# Patient Record
Sex: FEMALE | Race: WHITE | Employment: OTHER | URBAN - METROPOLITAN AREA
[De-identification: names, ages, dates, MRNs, and addresses within clinical notes are randomized per-mention and may not be internally consistent; named-entity substitution may affect disease eponyms.]

---

## 2020-01-08 ENCOUNTER — HOSPITAL ENCOUNTER (OUTPATIENT)
Dept: CT IMAGING | Age: 75
Discharge: HOME | End: 2020-01-08
Attending: FAMILY MEDICINE
Payer: SELF-PAY

## 2020-01-08 DIAGNOSIS — E78.2 HYPERLIPIDEMIA, MIXED: ICD-10-CM

## 2020-01-08 PROCEDURE — 75571 CT HRT W/O DYE W/CA TEST: CPT

## 2020-01-10 NOTE — CARDIO/PULMONARY
Reached patient at her given mobile number and shared her coronary artery calcium score of 122 with her. We discussed the meaning of this score. Patient plans to follow up with her PCP, Dr. Em Nava, who was the ordering provider. Patient has no further questions at this time.

## 2023-01-17 ENCOUNTER — TELEPHONE (OUTPATIENT)
Dept: CARDIOLOGY CLINIC | Age: 78
End: 2023-01-17

## 2023-01-24 ENCOUNTER — OFFICE VISIT (OUTPATIENT)
Dept: CARDIOLOGY CLINIC | Age: 78
End: 2023-01-24
Payer: MEDICARE

## 2023-01-24 VITALS
HEIGHT: 61 IN | HEART RATE: 70 BPM | OXYGEN SATURATION: 96 % | SYSTOLIC BLOOD PRESSURE: 120 MMHG | BODY MASS INDEX: 18.81 KG/M2 | WEIGHT: 99.6 LBS | DIASTOLIC BLOOD PRESSURE: 78 MMHG

## 2023-01-24 DIAGNOSIS — I25.119 CORONARY ARTERY DISEASE WITH ANGINA PECTORIS, UNSPECIFIED VESSEL OR LESION TYPE, UNSPECIFIED WHETHER NATIVE OR TRANSPLANTED HEART (HCC): Primary | ICD-10-CM

## 2023-01-24 DIAGNOSIS — M43.16 SPONDYLOLISTHESIS OF LUMBAR REGION: ICD-10-CM

## 2023-01-24 DIAGNOSIS — R06.02 SHORTNESS OF BREATH: ICD-10-CM

## 2023-01-24 DIAGNOSIS — E78.49 OTHER HYPERLIPIDEMIA: ICD-10-CM

## 2023-01-24 PROCEDURE — G8536 NO DOC ELDER MAL SCRN: HCPCS | Performed by: INTERNAL MEDICINE

## 2023-01-24 PROCEDURE — 1090F PRES/ABSN URINE INCON ASSESS: CPT | Performed by: INTERNAL MEDICINE

## 2023-01-24 PROCEDURE — G8400 PT W/DXA NO RESULTS DOC: HCPCS | Performed by: INTERNAL MEDICINE

## 2023-01-24 PROCEDURE — G8427 DOCREV CUR MEDS BY ELIG CLIN: HCPCS | Performed by: INTERNAL MEDICINE

## 2023-01-24 PROCEDURE — G8432 DEP SCR NOT DOC, RNG: HCPCS | Performed by: INTERNAL MEDICINE

## 2023-01-24 PROCEDURE — 1101F PT FALLS ASSESS-DOCD LE1/YR: CPT | Performed by: INTERNAL MEDICINE

## 2023-01-24 PROCEDURE — 93010 ELECTROCARDIOGRAM REPORT: CPT | Performed by: INTERNAL MEDICINE

## 2023-01-24 PROCEDURE — 99204 OFFICE O/P NEW MOD 45 MIN: CPT | Performed by: INTERNAL MEDICINE

## 2023-01-24 PROCEDURE — G8420 CALC BMI NORM PARAMETERS: HCPCS | Performed by: INTERNAL MEDICINE

## 2023-01-24 PROCEDURE — 1123F ACP DISCUSS/DSCN MKR DOCD: CPT | Performed by: INTERNAL MEDICINE

## 2023-01-24 PROCEDURE — 93005 ELECTROCARDIOGRAM TRACING: CPT | Performed by: INTERNAL MEDICINE

## 2023-01-24 PROCEDURE — G0463 HOSPITAL OUTPT CLINIC VISIT: HCPCS | Performed by: INTERNAL MEDICINE

## 2023-01-24 RX ORDER — DICLOFENAC SODIUM 75 MG/1
75 TABLET, DELAYED RELEASE ORAL 2 TIMES DAILY
COMMUNITY
Start: 2023-01-12

## 2023-01-24 RX ORDER — AMLODIPINE BESYLATE 5 MG/1
5 TABLET ORAL DAILY
COMMUNITY
Start: 2023-01-12

## 2023-01-24 RX ORDER — EZETIMIBE 10 MG/1
10 TABLET ORAL DAILY
Qty: 90 TABLET | Refills: 3 | Status: SHIPPED | OUTPATIENT
Start: 2023-01-24

## 2023-01-24 NOTE — PROGRESS NOTES
Chief Complaint   Patient presents with    Follow-up     Est. Care   Referred by PCP    Coronary Artery Disease       Vitals:    01/24/23 1031   BP: 120/78   Pulse: 70   Height: 5' 1\" (1.549 m)   Weight: 99 lb 9.6 oz (45.2 kg)   SpO2: 96%         Chest pain: tightness    SOB: no    Palpitations: racing anxiety     Dizziness: upon standing up fast    Swelling: no    Refills:       Have you been to the ER, urgent care clinic since your last visit? Hospitalized since your last visit? no    Have you seen or consulted other health care providers outside of the [facility] since your last visit?  (Include any pap smears or colon screening.)

## 2023-01-24 NOTE — PROGRESS NOTES
Per Dr. Leslie Samayoa- follow up 6 months with echo same day. Lipids prior to 6 mos appointment. Patient has lab req and instruction. Neurosurgery referral faxed to Dr. Janet Astudillo at [place]. Patient has contact info. [facility] neurosurgery contact info [phone] given as back up.

## 2023-01-24 NOTE — PROGRESS NOTES
De Fleming MD, MS, [address]            HISTORY OF PRESENT ILLNESS:    Danita Aguirre is a 68 y.o. female here to establish care. 2020 - moderate coronary plaque 60% percentile rank. Has been intolerant atorva, prava, simva. Destroyed her muscles,gave her brain fog. . Dr. Xavier Shabazz - orthopedic - wants to see Neurosurgery. Mom lived to 80. Dad  64, cancer, bladder. Sister heart rhythm. Is on amlodipine for raynauds. Gets bad cramps in night from spinal stenosis.    + SOB, has anxiety. Discussed trial of zetia. Discussed stress testing - defers for now given she is feeling relatively well. Asks for suggestions on Neurosurgeon for spinal stenosis. SUMMARY:   Problem List  Date Reviewed: 2023            Codes Class Noted    Other hyperlipidemia ICD-10-CM: E78.49  ICD-9-CM: 272.4  2023        SOB (shortness of breath) ICD-10-CM: R06.02  ICD-9-CM: 786.05  2023        Spondylolisthesis of lumbar region ICD-10-CM: M43.16  ICD-9-CM: 738.4  2014           Current Outpatient Medications on File Prior to Visit   Medication Sig    diclofenac EC (VOLTAREN) 75 mg EC tablet Take 75 mg by mouth two (2) times a day. amLODIPine (NORVASC) 5 mg tablet Take 5 mg by mouth daily. For Raynauds. No current facility-administered medications on file prior to visit. CARDIOLOGY STUDIES TO DATE:  No results found for this visit on 23. CARDIAC ROS:   positive for dyspnea    Past Medical History:   Diagnosis Date    Arthritis        History reviewed. No pertinent family history.     Social History     Socioeconomic History    Marital status:      Spouse name: Not on file    Number of children: Not on file    Years of education: Not on file    Highest education level: Not on file   Occupational History    Not on file   Tobacco Use    Smoking status: Former    Smokeless tobacco: Never   Substance and Sexual Activity    Alcohol use: Yes     Alcohol/week: 0.0 standard drinks     Types: 7 - 10 Glasses of wine per week    Drug use: No    Sexual activity: Not on file   Other Topics Concern    Not on file   Social History Narrative    Not on file     Social Determinants of Health     Financial Resource Strain: Not on file   Food Insecurity: Not on file   Transportation Needs: Not on file   Physical Activity: Not on file   Stress: Not on file   Social Connections: Not on file   Intimate Partner Violence: Not on file   Housing Stability: Not on file        GENERAL ROS:  A comprehensive review of systems was negative except for that written in the HPI.     Visit Vitals  /78   Pulse 70   Ht 5' 1\" (1.549 m)   Wt 45.2 kg (99 lb 9.6 oz)   SpO2 96%   BMI 18.82 kg/m²     Vitals:    01/24/23 1031   BP: 120/78   Pulse: 70   SpO2: 96%   Weight: 45.2 kg (99 lb 9.6 oz)   Height: 5' 1\" (1.549 m)        Wt Readings from Last 3 Encounters:   01/24/23 45.2 kg (99 lb 9.6 oz)   02/25/14 51.3 kg (113 lb 1.6 oz)   02/13/14 47.6 kg (105 lb)            BP Readings from Last 3 Encounters:   01/24/23 120/78   02/26/14 112/61       PHYSICAL EXAM  General appearance: alert, cooperative, no distress, appears stated age  Neck: supple, symmetrical, trachea midline, no adenopathy, thyroid: not enlarged, symmetric, no tenderness/mass/nodules, no carotid bruit, and no JVD  Lungs: clear to auscultation bilaterally  Heart: regular rate and rhythm, S1, S2 normal, no murmur, click, rub or gallop  Extremities: extremities normal, atraumatic, no cyanosis or edema    No results found for: CHOL, CHOLX, CHLST, CHOLV, [id number], HDL, HDLP, LDL, LDLC, DLDLP, TGLX, TRIGL, TRIGP, CHHD, CHHDX    Lab Results   Component Value Date/Time    WBC 7.4 02/26/2014 05:42 AM    HGB 9.2 (L) 02/26/2014 05:42 AM    HCT 27.4 (L) 02/26/2014 05:42 AM    PLATELET 269 64/73/6765 05:42 AM    MCV 89.5 02/26/2014 05:42 AM        No results found for: CHOL, CHOLPOCT, CHOLX, CHLST, CHOLV, HDL, HDLP, LDL, LDLC, DLDLP, TGLX, TRIGL, TRIGP, TGLPOCT, NTGLT, CHHD, [address]  [facility]     ASSESSMENT  Diagnoses and all orders for this visit:    1. Coronary artery disease with angina pectoris, unspecified vessel or lesion type, unspecified whether native or transplanted heart (HCC)  -     AMB POC EKG ROUTINE W/ 12 LEADS, INTER & REP  -     ezetimibe (ZETIA) 10 mg tablet; Take 1 Tablet by mouth daily.  -     LIPID PANEL; Future  -     ECHO ADULT COMPLETE; Future    2. Spondylolisthesis of lumbar region  -     REFERRAL TO NEUROSURGERY    3. Shortness of breath  -     ECHO ADULT COMPLETE; Future    4. Other hyperlipidemia       Encounter Diagnoses   Name Primary? Coronary artery disease with angina pectoris, unspecified vessel or lesion type, unspecified whether native or transplanted heart ([facility] [address] Yes    Spondylolisthesis of lumbar region     Shortness of breath     Other hyperlipidemia      Orders Placed This Encounter    LIPID PANEL    REFERRAL TO NEUROSURGERY    AMB POC EKG ROUTINE W/ 12 LEADS, INTER & REP    diclofenac EC (VOLTAREN) 75 mg EC tablet    amLODIPine (NORVASC) 5 mg tablet    ezetimibe (ZETIA) 10 mg tablet             Dagoberto Charles MD  1/24/2023        [address]   [address] [facility] [address]  [place], [address]  [phone] (F)    [address] [id number] [address]  [phone] (P)  [phone] (F)    ATTENTION:   This medical record was transcribed using an electronic medical records/speech recognition system. Although proofread, it may and can contain electronic, spelling and other errors. Corrections may be executed at a later time. Please feel free to contact us for any clarifications as needed.

## 2023-07-17 LAB
CHOLEST SERPL-MCNC: 239 MG/DL
HDLC SERPL-MCNC: 97 MG/DL
HDLC SERPL: 2.5 (ref 0–5)
LDLC SERPL CALC-MCNC: 128.8 MG/DL (ref 0–100)
TRIGL SERPL-MCNC: 66 MG/DL
VLDLC SERPL CALC-MCNC: 13.2 MG/DL

## 2023-07-24 ENCOUNTER — OFFICE VISIT (OUTPATIENT)
Age: 78
End: 2023-07-24
Payer: MEDICARE

## 2023-07-24 ENCOUNTER — ANCILLARY PROCEDURE (OUTPATIENT)
Age: 78
End: 2023-07-24
Payer: MEDICARE

## 2023-07-24 VITALS
BODY MASS INDEX: 18.69 KG/M2 | WEIGHT: 99 LBS | SYSTOLIC BLOOD PRESSURE: 120 MMHG | DIASTOLIC BLOOD PRESSURE: 78 MMHG | HEIGHT: 61 IN

## 2023-07-24 VITALS
SYSTOLIC BLOOD PRESSURE: 128 MMHG | HEIGHT: 59 IN | WEIGHT: 99 LBS | BODY MASS INDEX: 19.96 KG/M2 | DIASTOLIC BLOOD PRESSURE: 86 MMHG | HEART RATE: 58 BPM | OXYGEN SATURATION: 98 %

## 2023-07-24 DIAGNOSIS — R06.02 SOB (SHORTNESS OF BREATH): ICD-10-CM

## 2023-07-24 DIAGNOSIS — I25.10 CAD (CORONARY ARTERY DISEASE): ICD-10-CM

## 2023-07-24 DIAGNOSIS — E78.49 OTHER HYPERLIPIDEMIA: Primary | ICD-10-CM

## 2023-07-24 LAB
ECHO AO ASC DIAM: 3.5 CM
ECHO AO ASCENDING AORTA INDEX: 2.55 CM/M2
ECHO AO ROOT DIAM: 3.2 CM
ECHO AO ROOT INDEX: 2.34 CM/M2
ECHO AV AREA PEAK VELOCITY: 2.7 CM2
ECHO AV AREA VTI: 2.6 CM2
ECHO AV AREA/BSA PEAK VELOCITY: 2 CM2/M2
ECHO AV AREA/BSA VTI: 1.9 CM2/M2
ECHO AV MEAN GRADIENT: 3 MMHG
ECHO AV MEAN VELOCITY: 0.7 M/S
ECHO AV PEAK GRADIENT: 6 MMHG
ECHO AV PEAK VELOCITY: 1.2 M/S
ECHO AV VELOCITY RATIO: 0.92
ECHO AV VTI: 25.3 CM
ECHO BSA: 1.39 M2
ECHO LA DIAMETER INDEX: 2.41 CM/M2
ECHO LA DIAMETER: 3.3 CM
ECHO LA TO AORTIC ROOT RATIO: 1.03
ECHO LA VOL 2C: 49 ML (ref 22–52)
ECHO LA VOL 4C: 36 ML (ref 22–52)
ECHO LA VOL BP: 46 ML (ref 22–52)
ECHO LA VOL/BSA BIPLANE: 34 ML/M2 (ref 16–34)
ECHO LA VOLUME AREA LENGTH: 50 ML
ECHO LA VOLUME INDEX A2C: 36 ML/M2 (ref 16–34)
ECHO LA VOLUME INDEX A4C: 26 ML/M2 (ref 16–34)
ECHO LA VOLUME INDEX AREA LENGTH: 36 ML/M2 (ref 16–34)
ECHO LV E' LATERAL VELOCITY: 10 CM/S
ECHO LV E' SEPTAL VELOCITY: 6 CM/S
ECHO LV EDV A2C: 78 ML
ECHO LV EDV A4C: 73 ML
ECHO LV EDV BP: 76 ML (ref 56–104)
ECHO LV EDV INDEX A4C: 53 ML/M2
ECHO LV EDV INDEX BP: 55 ML/M2
ECHO LV EDV NDEX A2C: 57 ML/M2
ECHO LV EJECTION FRACTION A2C: 53 %
ECHO LV EJECTION FRACTION A4C: 59 %
ECHO LV EJECTION FRACTION BIPLANE: 55 % (ref 55–100)
ECHO LV ESV A2C: 37 ML
ECHO LV ESV A4C: 30 ML
ECHO LV ESV BP: 35 ML (ref 19–49)
ECHO LV ESV INDEX A2C: 27 ML/M2
ECHO LV ESV INDEX A4C: 22 ML/M2
ECHO LV ESV INDEX BP: 26 ML/M2
ECHO LV FRACTIONAL SHORTENING: 45 % (ref 28–44)
ECHO LV INTERNAL DIMENSION DIASTOLE INDEX: 2.41 CM/M2
ECHO LV INTERNAL DIMENSION DIASTOLIC: 3.3 CM (ref 3.9–5.3)
ECHO LV INTERNAL DIMENSION SYSTOLIC INDEX: 1.31 CM/M2
ECHO LV INTERNAL DIMENSION SYSTOLIC: 1.8 CM
ECHO LV IVSD: 1.3 CM (ref 0.6–0.9)
ECHO LV MASS 2D: 116.8 G (ref 67–162)
ECHO LV MASS INDEX 2D: 85.3 G/M2 (ref 43–95)
ECHO LV POSTERIOR WALL DIASTOLIC: 1 CM (ref 0.6–0.9)
ECHO LV RELATIVE WALL THICKNESS RATIO: 0.61
ECHO LVOT AREA: 3.1 CM2
ECHO LVOT AV VTI INDEX: 0.88
ECHO LVOT DIAM: 2 CM
ECHO LVOT MEAN GRADIENT: 2 MMHG
ECHO LVOT PEAK GRADIENT: 5 MMHG
ECHO LVOT PEAK VELOCITY: 1.1 M/S
ECHO LVOT STROKE VOLUME INDEX: 51.1 ML/M2
ECHO LVOT SV: 70 ML
ECHO LVOT VTI: 22.3 CM
ECHO MV A VELOCITY: 1.14 M/S
ECHO MV AREA PHT: 3.1 CM2
ECHO MV E DECELERATION TIME (DT): 241.3 MS
ECHO MV E VELOCITY: 0.66 M/S
ECHO MV E/A RATIO: 0.58
ECHO MV E/E' LATERAL: 6.6
ECHO MV E/E' RATIO (AVERAGED): 8.8
ECHO MV E/E' SEPTAL: 11
ECHO MV PRESSURE HALF TIME (PHT): 70 MS
ECHO MV REGURGITANT ALIASING (NYQUIST) VELOCITY: 37 CM/S
ECHO RV FREE WALL PEAK S': 12 CM/S
ECHO RV TAPSE: 2.2 CM (ref 1.7–?)
ECHO TV REGURGITANT MAX VELOCITY: 2.27 M/S
ECHO TV REGURGITANT PEAK GRADIENT: 21 MMHG

## 2023-07-24 PROCEDURE — 93306 TTE W/DOPPLER COMPLETE: CPT

## 2023-07-24 PROCEDURE — 1090F PRES/ABSN URINE INCON ASSESS: CPT | Performed by: INTERNAL MEDICINE

## 2023-07-24 PROCEDURE — 99214 OFFICE O/P EST MOD 30 MIN: CPT | Performed by: INTERNAL MEDICINE

## 2023-07-24 PROCEDURE — G8420 CALC BMI NORM PARAMETERS: HCPCS | Performed by: INTERNAL MEDICINE

## 2023-07-24 PROCEDURE — G8400 PT W/DXA NO RESULTS DOC: HCPCS | Performed by: INTERNAL MEDICINE

## 2023-07-24 PROCEDURE — 1036F TOBACCO NON-USER: CPT | Performed by: INTERNAL MEDICINE

## 2023-07-24 PROCEDURE — 1123F ACP DISCUSS/DSCN MKR DOCD: CPT | Performed by: INTERNAL MEDICINE

## 2023-07-24 PROCEDURE — G8427 DOCREV CUR MEDS BY ELIG CLIN: HCPCS | Performed by: INTERNAL MEDICINE

## 2023-07-24 RX ORDER — TERBINAFINE HYDROCHLORIDE 250 MG/1
250 TABLET ORAL DAILY
COMMUNITY
Start: 2023-06-05

## 2023-07-24 RX ORDER — AMLODIPINE BESYLATE 10 MG/1
10 TABLET ORAL DAILY
COMMUNITY
Start: 2023-06-27

## 2023-07-24 RX ORDER — ACETAMINOPHEN 325 MG/1
650 TABLET ORAL EVERY 6 HOURS PRN
COMMUNITY

## 2023-07-24 RX ORDER — HYDROXYCHLOROQUINE SULFATE 200 MG/1
200 TABLET, FILM COATED ORAL DAILY
COMMUNITY
Start: 2023-06-30

## 2023-07-24 RX ORDER — CYCLOBENZAPRINE HCL 5 MG
TABLET ORAL
COMMUNITY
Start: 2023-07-12

## 2023-07-24 ASSESSMENT — ENCOUNTER SYMPTOMS
SHORTNESS OF BREATH: 0
WHEEZING: 0
BACK PAIN: 1
CHEST TIGHTNESS: 0

## 2023-07-24 NOTE — PROGRESS NOTES
[place], [address] [facility], [place]            HISTORY OF PRESENT ILLNESS:    Yuliet Whaley is a 68 y.o. female presents today for had concerns including Follow-up (Echo today with Nathaniel Lopez ).       2020 - moderate coronary plaque 60% percentile rank. Has been intolerant atorva, prava, simva. Destroyed her muscles,gave her brain fog. . Dr. Janna Reyes - orthopedic - wants to see Neurosurgery. Mom lived to 80. Dad  64, cancer, bladder. Sister heart rhythm. Is on amlodipine for raynauds. Gets bad cramps in night from spinal stenosis.      + SOB, has anxiety. Discussed trial of zetia. Discussed stress testing - defers for now given she is feeling relatively well. Asks for suggestions on Neurosurgeon for spinal stenosis. Tolerating zetia, LDL down to 128 from 170, which is great. Discussed FU with Anna Wilcox. Coming off plaquenil. Echo done prior to her appt reviewed - normal LV fn, mild MR, aortic sclerosis with out stenosis. SUMMARY:   Patient Active Problem List   Diagnosis    Spondylolisthesis of lumbar region    Other hyperlipidemia    SOB (shortness of breath)       Current Outpatient Medications   Medication Sig Dispense Refill    ezetimibe (ZETIA) 10 MG tablet Take 10 mg by mouth daily       No current facility-administered medications for this visit. CARDIOLOGY STUDIES TO DATE:  No results found for this or any previous visit. No results found for this or any previous visit. No results found for this or any previous visit. Past Medical History:   Diagnosis Date    Arthritis        Social History     Socioeconomic History    Marital status:       Spouse name: Not on file    Number of children: Not on file    Years of education: Not on file    Highest education level: Not on file   Occupational History    Not on file   Tobacco Use    Smoking status: Former    Smokeless tobacco: Never   Substance and Sexual Activity

## 2023-07-24 NOTE — PROGRESS NOTES
Chief Complaint   Patient presents with    Follow-up     Echo today with Ana Laura Breedin/24/23 1119   BP: 128/86   Site: Left Upper Arm   Position: Sitting   Pulse: 58   SpO2: 98%   Weight: 99 lb (44.9 kg)   Height: 4' 11\" (1.499 m)         Chest pain denied     SOB denied     Palpitations denied     Swelling in hands/feet denied     Dizziness denied     Recent hospital stays denied     Refills denied

## 2023-12-28 PROBLEM — M54.50 LOW BACK PAIN: Status: ACTIVE | Noted: 2023-12-28

## 2023-12-28 PROBLEM — Z98.1 ARTHRODESIS STATUS: Status: ACTIVE | Noted: 2023-12-28

## 2023-12-28 PROBLEM — M48.062 NEUROGENIC CLAUDICATION DUE TO LUMBAR SPINAL STENOSIS: Status: ACTIVE | Noted: 2023-12-28

## 2024-01-23 RX ORDER — EZETIMIBE 10 MG/1
TABLET ORAL
Qty: 90 TABLET | Refills: 1 | Status: SHIPPED | OUTPATIENT
Start: 2024-01-23

## 2024-01-23 NOTE — TELEPHONE ENCOUNTER
Per verbal order from Dr. Bobbi Nassar  Last appt: 7/24/2023     No future appointments.    Requested Prescriptions     Signed Prescriptions Disp Refills    ezetimibe (ZETIA) 10 MG tablet 90 tablet 1     Sig: Will need follow up appt for further refills or defer to PCP.     Authorizing Provider: BOBBI NASSAR     Ordering User: QUIQUE TURNER